# Patient Record
Sex: FEMALE | Race: WHITE | Employment: FULL TIME | URBAN - METROPOLITAN AREA
[De-identification: names, ages, dates, MRNs, and addresses within clinical notes are randomized per-mention and may not be internally consistent; named-entity substitution may affect disease eponyms.]

---

## 2019-06-20 RX ORDER — CEFAZOLIN SODIUM/WATER 2 G/20 ML
2 SYRINGE (ML) INTRAVENOUS ONCE
Status: CANCELLED | OUTPATIENT
Start: 2019-06-20 | End: 2019-06-20

## 2019-07-17 ENCOUNTER — HOSPITAL ENCOUNTER (OUTPATIENT)
Dept: SURGERY | Age: 59
Discharge: HOME | End: 2019-07-17
Payer: OTHER GOVERNMENT

## 2019-07-17 VITALS
TEMPERATURE: 97.9 F | WEIGHT: 151.8 LBS | DIASTOLIC BLOOD PRESSURE: 61 MMHG | RESPIRATION RATE: 16 BRPM | HEIGHT: 65 IN | BODY MASS INDEX: 25.29 KG/M2 | SYSTOLIC BLOOD PRESSURE: 119 MMHG | HEART RATE: 80 BPM | OXYGEN SATURATION: 99 %

## 2019-07-17 LAB
ANION GAP SERPL CALC-SCNC: 7 MMOL/L (ref 7–16)
APPEARANCE UR: CLEAR
BACTERIA SPEC CULT: NORMAL
BASOPHILS # BLD: 0.1 K/UL (ref 0–0.2)
BASOPHILS NFR BLD: 1 % (ref 0–2)
BILIRUB UR QL: NEGATIVE
BUN SERPL-MCNC: 15 MG/DL (ref 6–23)
CALCIUM SERPL-MCNC: 9.4 MG/DL (ref 8.3–10.4)
CHLORIDE SERPL-SCNC: 105 MMOL/L (ref 98–107)
CO2 SERPL-SCNC: 28 MMOL/L (ref 21–32)
COLOR UR: YELLOW
CREAT SERPL-MCNC: 1.3 MG/DL (ref 0.6–1)
DIFFERENTIAL METHOD BLD: ABNORMAL
EOSINOPHIL # BLD: 0.3 K/UL (ref 0–0.8)
EOSINOPHIL NFR BLD: 3 % (ref 0.5–7.8)
ERYTHROCYTE [DISTWIDTH] IN BLOOD BY AUTOMATED COUNT: 13.2 % (ref 11.9–14.6)
GLUCOSE SERPL-MCNC: 93 MG/DL (ref 65–100)
GLUCOSE UR STRIP.AUTO-MCNC: NEGATIVE MG/DL
HCT VFR BLD AUTO: 39.7 % (ref 35.8–46.3)
HGB BLD-MCNC: 13.1 G/DL (ref 11.7–15.4)
HGB UR QL STRIP: NEGATIVE
IMM GRANULOCYTES # BLD AUTO: 0.1 K/UL (ref 0–0.5)
IMM GRANULOCYTES NFR BLD AUTO: 1 % (ref 0–5)
KETONES UR QL STRIP.AUTO: NEGATIVE MG/DL
LEUKOCYTE ESTERASE UR QL STRIP.AUTO: NEGATIVE
LYMPHOCYTES # BLD: 2.8 K/UL (ref 0.5–4.6)
LYMPHOCYTES NFR BLD: 28 % (ref 13–44)
MCH RBC QN AUTO: 31.4 PG (ref 26.1–32.9)
MCHC RBC AUTO-ENTMCNC: 33 G/DL (ref 31.4–35)
MCV RBC AUTO: 95.2 FL (ref 79.6–97.8)
MONOCYTES # BLD: 0.9 K/UL (ref 0.1–1.3)
MONOCYTES NFR BLD: 9 % (ref 4–12)
NEUTS SEG # BLD: 5.9 K/UL (ref 1.7–8.2)
NEUTS SEG NFR BLD: 59 % (ref 43–78)
NITRITE UR QL STRIP.AUTO: NEGATIVE
NRBC # BLD: 0 K/UL (ref 0–0.2)
PH UR STRIP: 6 [PH] (ref 5–9)
PLATELET # BLD AUTO: 278 K/UL (ref 150–450)
PMV BLD AUTO: 12.7 FL (ref 9.4–12.3)
POTASSIUM SERPL-SCNC: 3.1 MMOL/L (ref 3.5–5.1)
PROT UR STRIP-MCNC: NEGATIVE MG/DL
RBC # BLD AUTO: 4.17 M/UL (ref 4.05–5.2)
SERVICE CMNT-IMP: NORMAL
SODIUM SERPL-SCNC: 140 MMOL/L (ref 136–145)
SP GR UR REFRACTOMETRY: 1.01 (ref 1–1.02)
UROBILINOGEN UR QL STRIP.AUTO: 0.2 EU/DL (ref 0.2–1)
WBC # BLD AUTO: 10 K/UL (ref 4.3–11.1)

## 2019-07-17 PROCEDURE — 81003 URINALYSIS AUTO W/O SCOPE: CPT

## 2019-07-17 PROCEDURE — 85025 COMPLETE CBC W/AUTO DIFF WBC: CPT

## 2019-07-17 PROCEDURE — 77030027138 HC INCENT SPIROMETER -A

## 2019-07-17 PROCEDURE — 80048 BASIC METABOLIC PNL TOTAL CA: CPT

## 2019-07-17 PROCEDURE — 87641 MR-STAPH DNA AMP PROBE: CPT

## 2019-07-17 RX ORDER — LANOLIN ALCOHOL/MO/W.PET/CERES
1000 CREAM (GRAM) TOPICAL DAILY
COMMUNITY

## 2019-07-17 RX ORDER — MONTELUKAST SODIUM 10 MG/1
10 TABLET ORAL DAILY
COMMUNITY

## 2019-07-17 RX ORDER — ATORVASTATIN CALCIUM 40 MG/1
40 TABLET, FILM COATED ORAL
COMMUNITY
Start: 2018-03-27

## 2019-07-17 RX ORDER — CETIRIZINE HCL 10 MG
1 TABLET ORAL DAILY
COMMUNITY

## 2019-07-17 RX ORDER — CLONAZEPAM 0.5 MG/1
1 TABLET ORAL
COMMUNITY

## 2019-07-17 RX ORDER — VILAZODONE HYDROCHLORIDE 20 MG/1
20 TABLET ORAL
COMMUNITY

## 2019-07-17 RX ORDER — ALBUTEROL SULFATE 90 UG/1
2 AEROSOL, METERED RESPIRATORY (INHALATION)
COMMUNITY

## 2019-07-17 RX ORDER — LISINOPRIL 20 MG/1
20 TABLET ORAL DAILY
COMMUNITY

## 2019-07-17 RX ORDER — PANTOPRAZOLE SODIUM 40 MG/1
40 TABLET, DELAYED RELEASE ORAL DAILY
COMMUNITY

## 2019-07-17 RX ORDER — DULOXETIN HYDROCHLORIDE 30 MG/1
1 CAPSULE, DELAYED RELEASE ORAL DAILY
COMMUNITY
End: 2021-06-18 | Stop reason: ALTCHOICE

## 2019-07-17 RX ORDER — HYDROCODONE BITARTRATE AND ACETAMINOPHEN 7.5; 325 MG/1; MG/1
1 TABLET ORAL
COMMUNITY
End: 2019-07-24

## 2019-07-17 RX ORDER — OLOPATADINE HYDROCHLORIDE 1 MG/ML
1 SOLUTION/ DROPS OPHTHALMIC
COMMUNITY
Start: 2018-07-06

## 2019-07-17 RX ORDER — ESTRADIOL 0.1 MG/G
CREAM VAGINAL AS NEEDED
COMMUNITY
Start: 2018-04-09 | End: 2021-07-20

## 2019-07-17 RX ORDER — ALBUTEROL SULFATE 0.83 MG/ML
SOLUTION RESPIRATORY (INHALATION)
COMMUNITY

## 2019-07-17 RX ORDER — LOPERAMIDE HYDROCHLORIDE 2 MG/1
1 CAPSULE ORAL AS NEEDED
COMMUNITY

## 2019-07-17 RX ORDER — HYDROCHLOROTHIAZIDE 25 MG/1
25 TABLET ORAL DAILY
COMMUNITY
End: 2021-06-18 | Stop reason: ALTCHOICE

## 2019-07-17 RX ORDER — CYCLOBENZAPRINE HCL 5 MG
5 TABLET ORAL
COMMUNITY
Start: 2019-06-05

## 2019-07-17 NOTE — PERIOP NOTES
Patient verified name & . Order to obtain consent found in EHR and matches case posting. TYPE  CASE:2              Orders:  received  Labs per Spine protocol:  CBC with dif, BMP, UA, MRSA/MSSA nasal swab   Labs per anesthesia protocol: Type and Screen DOS  EKG/cardiac records  :  Not indicated  Glucose: not indicated    Instructed patient to continue previous medications as prescribed prior to surgery and  to [address] according to anesthesia guidelines with a small sip of water : Norco, atorvastatin, Klonopin, Cymbalta, Protonix, singulair, Viibryd, Nebulizer treatment. Pt verbalizes understanding to bring rescue inhaler, spine recovery book and incentive spirometer to the hospital DOS. Continue all previous medications unless otherwise directed. Instructed patient to hold all vitamins and supplements (with exception of renal vitamins) and the following medications prior to surgery: none    Pt viewed Spine Pre-hab Video. All further questions were addressed. Pt was provided with antibacterial or non-moisturizing soap, Hibiclens, long-handled sponge, Spine Recovery booklet and incentive spirometer. Pt correctly demonstrated use of incentive spirometer and instruction to bring it to the hospital on day of surgery. Handouts and all Surgery instructions provided to pt and pt verbalizes understanding. Patient Guide to Surgery Packet provided to patient. Packet includes Patient Guide to surgery handout, Facts about Pain Management handout, Facts about Urinary Catherization handout, Hand Hygiene handout, Patient Education and Teaching Sheet -Transfusion of Blood and Blood Products handout, and  [facility] frequent question and answer sheet. Guide reviewed with the patient and all questions answered to the satisfaction of the patient. Pt advised to visit [email or web address]-SENS for more educational information regarding anesthesia and to record any additional questions that arise so that it can be addressed by the anesthesiologist on the morning of surgery. Patient instructed on the following and verbalized understanding:  Arrive at MAIN entrance, time of arrival to be called the day before by 1700. Responsible adult must drive patient to and from hospital, stay during surgery and 24 hours postoperatively. Npo after midnight including gum, mints and ice chips. Shower using hibiclens the night before and the morning of surgery. Hibiclens provided to the patient with handout and verbal instructions for use. Leave all valuables at home. Instructed on no jewelry or body piercings on the dos. Bring insurance card and ID. No perfumes, oil, powder, colognes, makeup or  lotions on the skin. Patient verbalized understanding of all instructions and provided all medical/health information to the best of their ability.

## 2019-07-17 NOTE — PERIOP NOTES
Recent Results (from the past 12 hour(s))   MSSA/MRSA SC BY PCR, NASAL SWAB    Collection Time: 07/17/19  8:21 AM   Result Value Ref Range    Special Requests: NO SPECIAL REQUESTS      Culture result:        SA target not detected. A MRSA NEGATIVE, SA NEGATIVE test result does not preclude MRSA or SA nasal colonization. CBC WITH AUTOMATED DIFF    Collection Time: 07/17/19  8:21 AM   Result Value Ref Range    WBC 10.0 4.3 - 11.1 K/uL    RBC 4.17 4.05 - 5.2 M/uL    HGB 13.1 11.7 - 15.4 g/dL    HCT 39.7 35.8 - 46.3 %    MCV 95.2 79.6 - 97.8 FL    MCH 31.4 26.1 - 32.9 PG    MCHC 33.0 31.4 - 35.0 g/dL    RDW 13.2 11.9 - 14.6 %    PLATELET 110 512 - 474 K/uL    MPV 12.7 (H) 9.4 - 12.3 FL    ABSOLUTE NRBC 0.00 0.0 - 0.2 K/uL    DF AUTOMATED      NEUTROPHILS 59 43 - 78 %    LYMPHOCYTES 28 13 - 44 %    MONOCYTES 9 4.0 - 12.0 %    EOSINOPHILS 3 0.5 - 7.8 %    BASOPHILS 1 0.0 - 2.0 %    IMMATURE GRANULOCYTES 1 0.0 - 5.0 %    ABS. NEUTROPHILS 5.9 1.7 - 8.2 K/UL    ABS. LYMPHOCYTES 2.8 0.5 - 4.6 K/UL    ABS. MONOCYTES 0.9 0.1 - 1.3 K/UL    ABS. EOSINOPHILS 0.3 0.0 - 0.8 K/UL    ABS. BASOPHILS 0.1 0.0 - 0.2 K/UL    ABS. IMM.  GRANS. 0.1 0.0 - 0.5 K/UL   METABOLIC PANEL, BASIC    Collection Time: 07/17/19  8:21 AM   Result Value Ref Range    Sodium 140 136 - 145 mmol/L    Potassium 3.1 (L) 3.5 - 5.1 mmol/L    Chloride 105 98 - 107 mmol/L    CO2 28 21 - 32 mmol/L    Anion gap 7 7 - 16 mmol/L    Glucose 93 65 - 100 mg/dL    BUN 15 6 - 23 MG/DL    Creatinine 1.30 (H) 0.6 - 1.0 MG/DL    GFR est AA 54 (L) >60 ml/min/1.73m2    GFR est non-AA 45 (L) >60 ml/min/1.73m2    Calcium 9.4 8.3 - 10.4 MG/DL   URINALYSIS W/ RFLX MICROSCOPIC    Collection Time: 07/17/19  8:21 AM   Result Value Ref Range    Color YELLOW      Appearance CLEAR      Specific gravity 1.010 1.001 - 1.023      pH (UA) 6.0 5.0 - 9.0      Protein NEGATIVE  NEG mg/dL    Glucose NEGATIVE  NEG mg/dL    Ketone NEGATIVE  NEG mg/dL    Bilirubin NEGATIVE  NEG      Blood NEGATIVE  NEG      Urobilinogen 0.2 0.2 - 1.0 EU/dL    Nitrites NEGATIVE  NEG      Leukocyte Esterase NEGATIVE  NEG      Lab results reviewed. No further action indicated.

## 2019-07-22 NOTE — H&P
Chief Compliant: Neck pain, headaches, tremor, gait imbalance    History of present illness: This is a very pleasant 61year old female who presents with a one year history of neck pain and radiation primarily to the right greater than left shoulder and upper extremity. She has not also noticed headaches, gait imbalance, hand writing change. The onset of the symptoms was rather insidious. She describes the quality of the pain as a deep ache with intermittent sharp and shooting sensations. A tingling sensation is in the right posterior arm and ulnar forearm. There is also some associated pain in the periscapular area. She has noticed changes in fine motor skills such as handwriting and buttoning buttons. She has also noticed change in gait since the onset of the symptoms. The symptoms seem to be aggravated by overhead activities, and somewhat alleviated by rest. Pain is rated 10/10 on the Visual Analog Scale. Thus far, efforts to address the pain have included NSAIDs, pain medication, muscle relaxants, physical therapy. PMHx/PSHx/Medications/Allergies/ROS are listed and have been reviewed. Review of systems was noted. Pertinent positives and negatives were discussed with the patient particularly those that related to musculoskeletal complaints. Nonorthopedic complaints were directed to the primary care physician. Medications: Atorvastatin Calcium; Cetirizine HCl;Cyclobenzaprine HCl;DULoxetine HCl;hydroCHLOROthiazide; Lisinopril;Norco;Olopatadine HCl;Pantoprazole Sodium;Singulair;Viibryd;Vitamin B12;Vitamin D3  ????? Allergies: NKDA  ?????    Physical Exam:     This is a well developed well nourished adult female in no acute distress. She is oriented to person, place and time. Mood and affect are appropriate. Respirations are unlabored and there is no evidence of cyanosis. Chest is clear to auscultation bilaterally. Heart is regular rate and rhythm.     Inspection of the neck reveals no evidence of rash or skin lesion. Examination of the cervical spine reveals no evidence of sagittal or coronal plane deformity. She can flex normally but extension is limited by exacerbation of the symptoms. Spurlings sign is positive for reproduction of radicular symptoms. Lhermitte sign is positive. There is not significant tenderness to palpation along the spinous processes or paraspinal musculature. Sensory testing reveals intact sensation to light touch in the distribution of the C5-T1 dermatomes bilaterally, except for decreased sensation over the bilateral ulnar forearms. Gait is unsteady. Reflexes    Right Left   Biceps (C5) 3 3   Brachio radialis (C6) 3 3    Triceps (C7) 3 3               Hiltons is positive. Ankle jerk is positive. Rhomberg testing is positive. Finger escape test is positive. Inverted radial reflex is positive. Tinels and Jose testing over the cubital and carpal tunnels do not reproduce the symptoms. Shoulder examination is not consistent with adhesive capsulitis or acute rotator cuff tendinitis. The patient does have difficulty with rapid alternating hand movements. Strength testing in the upper extremity reveals the following based on the 5 point grading scale:     Delt(C5) Bicep(C6) WE(C6) Tricep (C7) WF(C7) (C8) Int (T1)   Right NT NT 5 5 5 4 4   Left 5 5 5 3 4 4 4       Pulses are palpable over bilateral radial arteries. Radiographic Studies:          MRI Cervical spine, report and images independently reviewed and reveals degenerative disc and spondylotic changes at C5-C6 and C6-C7 resulting in moderate to significant stenosis. There is subtle myelomalacia. 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    I discussed the natural history of this condition with her in that this condition is typically slowly progressive and may begin to affect gait and coordination to a greater extent. Since myelopathy is typically progressive and can lead to irreversible neurologic deficits, we also discussed potential surgical options. ????? We discussed the details of the surgery including an incision over the left side of the front of the neck. The windpipe and foodpipe would be retracted to the side to expose the underlying spine. The appropriate disc would be identified with an X-ray and the disc would be removed. The nerves would be freed by trimming any impinging structures such as bone spurs and disc material.   The disc space would then be filled with a spacer and bone graft from a cadaver. A drain may be placed, and then the wound would be closed with suture and covered with sterile dressings. She would expect to either be discharged home postoperatively or stay in the hospital overnight depending on how quickly she recovers. Follow-up would be scheduled for 2-3 weeks and she would have restrictions including no driving for 2 weeks, no lifting greater than 15 lbs. We also discussed the potential risks of the surgery including, but not limited to infection, spinal fluid leak, compressive hematoma; injury to spinal cord or peripheral nerve root resulting in paralysis, or loss of use of an extremity; persistent neck or arm symptoms or pain at the bone graft site; failure of the bone graft to heal or failure of the hardware resulting in the possibility of needing additional surgery; postoperative hoarseness or dysphagia; injury to an artery or vein resulting in significant blood loss; and the risks of anesthesia including, but not limited heart attack, stroke, blood clot or death. The patient was also given a brochure on anterior cervical discectomy and fusion. The patient voiced an understanding of these issues outlined.   The procedure that I think may be beneficial here is an anterior cervical discectomy and fusion with allograft, interbody spacer, and instrumentation from -7.             Electronically Signed By Bonny West MD

## 2019-07-23 ENCOUNTER — ANESTHESIA EVENT (OUTPATIENT)
Dept: SURGERY | Age: 59
End: 2019-07-23
Payer: OTHER GOVERNMENT

## 2019-07-24 ENCOUNTER — APPOINTMENT (OUTPATIENT)
Dept: GENERAL RADIOLOGY | Age: 59
End: 2019-07-24
Attending: ORTHOPAEDIC SURGERY
Payer: OTHER GOVERNMENT

## 2019-07-24 ENCOUNTER — ANESTHESIA (OUTPATIENT)
Dept: SURGERY | Age: 59
End: 2019-07-24
Payer: OTHER GOVERNMENT

## 2019-07-24 ENCOUNTER — HOSPITAL ENCOUNTER (OUTPATIENT)
Age: 59
Setting detail: OUTPATIENT SURGERY
Discharge: HOME | End: 2019-07-24
Attending: ORTHOPAEDIC SURGERY | Admitting: ORTHOPAEDIC SURGERY
Payer: OTHER GOVERNMENT

## 2019-07-24 VITALS
HEART RATE: 82 BPM | HEIGHT: 64 IN | RESPIRATION RATE: 14 BRPM | OXYGEN SATURATION: 94 % | WEIGHT: 153.13 LBS | TEMPERATURE: 97.9 F | SYSTOLIC BLOOD PRESSURE: 110 MMHG | BODY MASS INDEX: 26.14 KG/M2 | DIASTOLIC BLOOD PRESSURE: 58 MMHG

## 2019-07-24 DIAGNOSIS — M48.02 CERVICAL SPINAL STENOSIS: Primary | ICD-10-CM

## 2019-07-24 PROBLEM — G95.9 MYELOPATHY (HCC): Status: ACTIVE | Noted: 2019-07-24

## 2019-07-24 LAB
ABO + RH BLD: NORMAL
BLOOD GROUP ANTIBODIES SERPL: NORMAL
SPECIMEN EXP DATE BLD: NORMAL

## 2019-07-24 PROCEDURE — 77030003666 HC NDL SPINAL BD -A: Performed by: ORTHOPAEDIC SURGERY

## 2019-07-24 PROCEDURE — 74011250636 HC RX REV CODE- 250/636: Performed by: ORTHOPAEDIC SURGERY

## 2019-07-24 PROCEDURE — 72020 X-RAY EXAM OF SPINE 1 VIEW: CPT

## 2019-07-24 PROCEDURE — 77030025623 HC BUR RND PRECIS STRY -D: Performed by: ORTHOPAEDIC SURGERY

## 2019-07-24 PROCEDURE — 77030030163 HC BN WAX J&J -A: Performed by: ORTHOPAEDIC SURGERY

## 2019-07-24 PROCEDURE — 76010000162 HC OR TIME 1.5 TO 2 HR INTENSV-TIER 1: Performed by: ORTHOPAEDIC SURGERY

## 2019-07-24 PROCEDURE — 77030016570 HC BLNKT BAIR HGGR 3M -B: Performed by: ANESTHESIOLOGY

## 2019-07-24 PROCEDURE — 77030031139 HC SUT VCRL2 J&J -A: Performed by: ORTHOPAEDIC SURGERY

## 2019-07-24 PROCEDURE — 74011250637 HC RX REV CODE- 250/637: Performed by: ORTHOPAEDIC SURGERY

## 2019-07-24 PROCEDURE — C1713 ANCHOR/SCREW BN/BN,TIS/BN: HCPCS | Performed by: ORTHOPAEDIC SURGERY

## 2019-07-24 PROCEDURE — 86900 BLOOD TYPING SEROLOGIC ABO: CPT

## 2019-07-24 PROCEDURE — 77030018836 HC SOL IRR NACL ICUM -A: Performed by: ORTHOPAEDIC SURGERY

## 2019-07-24 PROCEDURE — 77030021678 HC GLIDESCP STAT DISP VERT -B: Performed by: ANESTHESIOLOGY

## 2019-07-24 PROCEDURE — 74011000250 HC RX REV CODE- 250

## 2019-07-24 PROCEDURE — 77030037088 HC TUBE ENDOTRACH ORAL NSL COVD-A: Performed by: ANESTHESIOLOGY

## 2019-07-24 PROCEDURE — 74011250636 HC RX REV CODE- 250/636: Performed by: ANESTHESIOLOGY

## 2019-07-24 PROCEDURE — 76210000006 HC OR PH I REC 0.5 TO 1 HR: Performed by: ORTHOPAEDIC SURGERY

## 2019-07-24 PROCEDURE — 76060000034 HC ANESTHESIA 1.5 TO 2 HR: Performed by: ORTHOPAEDIC SURGERY

## 2019-07-24 PROCEDURE — 77030019908 HC STETH ESOPH SIMS -A: Performed by: ANESTHESIOLOGY

## 2019-07-24 PROCEDURE — 77030002986 HC SUT PROL J&J -A: Performed by: ORTHOPAEDIC SURGERY

## 2019-07-24 PROCEDURE — 77030011267 HC ELECTRD BLD COVD -A: Performed by: ORTHOPAEDIC SURGERY

## 2019-07-24 PROCEDURE — 74011250636 HC RX REV CODE- 250/636

## 2019-07-24 PROCEDURE — 74011250637 HC RX REV CODE- 250/637: Performed by: ANESTHESIOLOGY

## 2019-07-24 PROCEDURE — 74011000250 HC RX REV CODE- 250: Performed by: ORTHOPAEDIC SURGERY

## 2019-07-24 PROCEDURE — 77030014650 HC SEAL MTRX FLOSEL BAXT -C: Performed by: ORTHOPAEDIC SURGERY

## 2019-07-24 PROCEDURE — 76210000020 HC REC RM PH II FIRST 0.5 HR: Performed by: ORTHOPAEDIC SURGERY

## 2019-07-24 PROCEDURE — 77030010514 HC APPL CLP LIG COVD -B: Performed by: ORTHOPAEDIC SURGERY

## 2019-07-24 PROCEDURE — 77030039155 HC CGE SPN ANT CERV TRITANIUM STRY -G: Performed by: ORTHOPAEDIC SURGERY

## 2019-07-24 PROCEDURE — 77030009868 HC PIN DISTR CASPR AESC -B: Performed by: ORTHOPAEDIC SURGERY

## 2019-07-24 PROCEDURE — 77030032490 HC SLV COMPR SCD KNE COVD -B: Performed by: ORTHOPAEDIC SURGERY

## 2019-07-24 DEVICE — GRAFT BNE SUB 1CC 2MM GRAN ALLOGENIC MORPHOGENETIC PROT W: Type: IMPLANTABLE DEVICE | Site: SPINE CERVICAL | Status: FUNCTIONAL

## 2019-07-24 DEVICE — ANTERIOR CERVICAL CAGE
Type: IMPLANTABLE DEVICE | Site: SPINE CERVICAL | Status: FUNCTIONAL
Brand: TRITANIUM C

## 2019-07-24 DEVICE — VARIABLE, SELF-DRILLING SCREW
Type: IMPLANTABLE DEVICE | Site: SPINE CERVICAL | Status: FUNCTIONAL
Brand: AVIATOR

## 2019-07-24 DEVICE — BIO DBM PUTTY
Type: IMPLANTABLE DEVICE | Site: SPINE CERVICAL | Status: FUNCTIONAL
Brand: BIO DBM

## 2019-07-24 DEVICE — TWO-LEVEL ANTERIOR PLATE
Type: IMPLANTABLE DEVICE | Site: SPINE CERVICAL | Status: FUNCTIONAL
Brand: AVIATOR

## 2019-07-24 RX ORDER — FENTANYL CITRATE 50 UG/ML
100 INJECTION, SOLUTION INTRAMUSCULAR; INTRAVENOUS ONCE
Status: DISCONTINUED | OUTPATIENT
Start: 2019-07-24 | End: 2019-07-24 | Stop reason: HOSPADM

## 2019-07-24 RX ORDER — OXYCODONE HYDROCHLORIDE 5 MG/1
5 TABLET ORAL
Status: COMPLETED | OUTPATIENT
Start: 2019-07-24 | End: 2019-07-24

## 2019-07-24 RX ORDER — PROMETHAZINE HYDROCHLORIDE 12.5 MG/1
12.5 TABLET ORAL
Qty: 30 TAB | Refills: 0 | Status: SHIPPED | OUTPATIENT
Start: 2019-07-24 | End: 2021-07-20

## 2019-07-24 RX ORDER — LIDOCAINE HYDROCHLORIDE 10 MG/ML
0.1 INJECTION INFILTRATION; PERINEURAL AS NEEDED
Status: DISCONTINUED | OUTPATIENT
Start: 2019-07-24 | End: 2019-07-24 | Stop reason: HOSPADM

## 2019-07-24 RX ORDER — GLYCOPYRROLATE 0.2 MG/ML
INJECTION INTRAMUSCULAR; INTRAVENOUS AS NEEDED
Status: DISCONTINUED | OUTPATIENT
Start: 2019-07-24 | End: 2019-07-24 | Stop reason: HOSPADM

## 2019-07-24 RX ORDER — LIDOCAINE HYDROCHLORIDE 20 MG/ML
INJECTION, SOLUTION EPIDURAL; INFILTRATION; INTRACAUDAL; PERINEURAL AS NEEDED
Status: DISCONTINUED | OUTPATIENT
Start: 2019-07-24 | End: 2019-07-24

## 2019-07-24 RX ORDER — EPHEDRINE SULFATE 50 MG/ML
INJECTION, SOLUTION INTRAVENOUS AS NEEDED
Status: DISCONTINUED | OUTPATIENT
Start: 2019-07-24 | End: 2019-07-24 | Stop reason: HOSPADM

## 2019-07-24 RX ORDER — SODIUM CHLORIDE, SODIUM LACTATE, POTASSIUM CHLORIDE, CALCIUM CHLORIDE 600; 310; 30; 20 MG/100ML; MG/100ML; MG/100ML; MG/100ML
75 INJECTION, SOLUTION INTRAVENOUS CONTINUOUS
Status: DISCONTINUED | OUTPATIENT
Start: 2019-07-24 | End: 2019-07-24 | Stop reason: HOSPADM

## 2019-07-24 RX ORDER — HYDROMORPHONE HYDROCHLORIDE 2 MG/ML
0.5 INJECTION, SOLUTION INTRAMUSCULAR; INTRAVENOUS; SUBCUTANEOUS
Status: DISCONTINUED | OUTPATIENT
Start: 2019-07-24 | End: 2019-07-24 | Stop reason: HOSPADM

## 2019-07-24 RX ORDER — CLINDAMYCIN PHOSPHATE 900 MG/50ML
900 INJECTION INTRAVENOUS
Status: COMPLETED | OUTPATIENT
Start: 2019-07-24 | End: 2019-07-24

## 2019-07-24 RX ORDER — ONDANSETRON 2 MG/ML
INJECTION INTRAMUSCULAR; INTRAVENOUS AS NEEDED
Status: DISCONTINUED | OUTPATIENT
Start: 2019-07-24 | End: 2019-07-24 | Stop reason: HOSPADM

## 2019-07-24 RX ORDER — ROCURONIUM BROMIDE 10 MG/ML
INJECTION, SOLUTION INTRAVENOUS AS NEEDED
Status: DISCONTINUED | OUTPATIENT
Start: 2019-07-24 | End: 2019-07-24 | Stop reason: HOSPADM

## 2019-07-24 RX ORDER — HYDROCODONE BITARTRATE AND ACETAMINOPHEN 5; 325 MG/1; MG/1
2 TABLET ORAL AS NEEDED
Status: DISCONTINUED | OUTPATIENT
Start: 2019-07-24 | End: 2019-07-24 | Stop reason: HOSPADM

## 2019-07-24 RX ORDER — LIDOCAINE HYDROCHLORIDE 20 MG/ML
INJECTION, SOLUTION EPIDURAL; INFILTRATION; INTRACAUDAL; PERINEURAL AS NEEDED
Status: DISCONTINUED | OUTPATIENT
Start: 2019-07-24 | End: 2019-07-24 | Stop reason: HOSPADM

## 2019-07-24 RX ORDER — OXYCODONE HYDROCHLORIDE 5 MG/1
5 TABLET ORAL
Qty: 40 TAB | Refills: 0 | Status: SHIPPED | OUTPATIENT
Start: 2019-07-24 | End: 2019-07-31

## 2019-07-24 RX ORDER — FENTANYL CITRATE 50 UG/ML
INJECTION, SOLUTION INTRAMUSCULAR; INTRAVENOUS AS NEEDED
Status: DISCONTINUED | OUTPATIENT
Start: 2019-07-24 | End: 2019-07-24 | Stop reason: HOSPADM

## 2019-07-24 RX ORDER — MIDAZOLAM HYDROCHLORIDE 1 MG/ML
2 INJECTION, SOLUTION INTRAMUSCULAR; INTRAVENOUS
Status: DISCONTINUED | OUTPATIENT
Start: 2019-07-24 | End: 2019-07-24 | Stop reason: HOSPADM

## 2019-07-24 RX ORDER — NEOSTIGMINE METHYLSULFATE 1 MG/ML
INJECTION INTRAVENOUS AS NEEDED
Status: DISCONTINUED | OUTPATIENT
Start: 2019-07-24 | End: 2019-07-24 | Stop reason: HOSPADM

## 2019-07-24 RX ORDER — ACETAMINOPHEN 10 MG/ML
INJECTION, SOLUTION INTRAVENOUS AS NEEDED
Status: DISCONTINUED | OUTPATIENT
Start: 2019-07-24 | End: 2019-07-24 | Stop reason: HOSPADM

## 2019-07-24 RX ORDER — PROPOFOL 10 MG/ML
INJECTION, EMULSION INTRAVENOUS AS NEEDED
Status: DISCONTINUED | OUTPATIENT
Start: 2019-07-24 | End: 2019-07-24 | Stop reason: HOSPADM

## 2019-07-24 RX ADMIN — FENTANYL CITRATE 50 MCG: 50 INJECTION, SOLUTION INTRAMUSCULAR; INTRAVENOUS at 07:22

## 2019-07-24 RX ADMIN — GLYCOPYRROLATE 0.4 MG: 0.2 INJECTION INTRAMUSCULAR; INTRAVENOUS at 08:37

## 2019-07-24 RX ADMIN — SODIUM CHLORIDE, SODIUM LACTATE, POTASSIUM CHLORIDE, AND CALCIUM CHLORIDE: 600; 310; 30; 20 INJECTION, SOLUTION INTRAVENOUS at 08:37

## 2019-07-24 RX ADMIN — HYDROMORPHONE HYDROCHLORIDE 0.5 MG: 2 INJECTION INTRAMUSCULAR; INTRAVENOUS; SUBCUTANEOUS at 08:57

## 2019-07-24 RX ADMIN — SODIUM CHLORIDE, SODIUM LACTATE, POTASSIUM CHLORIDE, AND CALCIUM CHLORIDE: 600; 310; 30; 20 INJECTION, SOLUTION INTRAVENOUS at 07:13

## 2019-07-24 RX ADMIN — Medication 3 AMPULE: at 06:04

## 2019-07-24 RX ADMIN — LIDOCAINE HYDROCHLORIDE 100 MG: 20 INJECTION, SOLUTION EPIDURAL; INFILTRATION; INTRACAUDAL; PERINEURAL at 07:22

## 2019-07-24 RX ADMIN — ROCURONIUM BROMIDE 10 MG: 10 INJECTION, SOLUTION INTRAVENOUS at 08:19

## 2019-07-24 RX ADMIN — NEOSTIGMINE METHYLSULFATE 3 MG: 1 INJECTION INTRAVENOUS at 08:37

## 2019-07-24 RX ADMIN — PROPOFOL 200 MG: 10 INJECTION, EMULSION INTRAVENOUS at 07:22

## 2019-07-24 RX ADMIN — SODIUM CHLORIDE, SODIUM LACTATE, POTASSIUM CHLORIDE, AND CALCIUM CHLORIDE 75 ML/HR: 600; 310; 30; 20 INJECTION, SOLUTION INTRAVENOUS at 06:04

## 2019-07-24 RX ADMIN — ONDANSETRON 4 MG: 2 INJECTION INTRAMUSCULAR; INTRAVENOUS at 08:37

## 2019-07-24 RX ADMIN — ROCURONIUM BROMIDE 40 MG: 10 INJECTION, SOLUTION INTRAVENOUS at 07:22

## 2019-07-24 RX ADMIN — ACETAMINOPHEN 1000 MG: 10 INJECTION, SOLUTION INTRAVENOUS at 08:12

## 2019-07-24 RX ADMIN — OXYCODONE HYDROCHLORIDE 5 MG: 5 TABLET ORAL at 09:10

## 2019-07-24 RX ADMIN — FENTANYL CITRATE 50 MCG: 50 INJECTION, SOLUTION INTRAMUSCULAR; INTRAVENOUS at 07:41

## 2019-07-24 RX ADMIN — HYDROMORPHONE HYDROCHLORIDE 0.5 MG: 2 INJECTION INTRAMUSCULAR; INTRAVENOUS; SUBCUTANEOUS at 09:08

## 2019-07-24 RX ADMIN — EPHEDRINE SULFATE 10 MG: 50 INJECTION, SOLUTION INTRAVENOUS at 07:52

## 2019-07-24 RX ADMIN — CLINDAMYCIN PHOSPHATE 900 MG: 900 INJECTION, SOLUTION INTRAVENOUS at 07:15

## 2019-07-24 NOTE — DISCHARGE INSTRUCTIONS
See attached discharge sheet. After general anesthesia or intravenous sedation, for 24 hours or while taking prescription Narcotics:  · Limit your activities  · A responsible adult needs to be with you for the next 24 hours  · Do not drive and operate hazardous machinery  · Do not make important personal or business decisions  · Do  not drink alcoholic beverages  · If you have not urinated within 8 hours after discharge, please contact your surgeon on call. *  Please give a list of your current medications to your Primary Care Provider. *  Please update this list whenever your medications are discontinued, doses are      changed, or new medications (including over-the-counter products) are added. *  Please carry medication information at all times in case of emergency situations. These are general instructions for a healthy lifestyle:  No smoking/ No tobacco products/ Avoid exposure to second hand smoke  Surgeon General's Warning:  Quitting smoking now greatly reduces serious risk to your health. Obesity, smoking, and sedentary lifestyle greatly increases your risk for illness  A healthy diet, regular physical exercise & weight monitoring are important for maintaining a healthy lifestyle    You may be retaining fluid if you have a history of heart failure or if you experience any of the following symptoms:  Weight gain of 3 pounds or more overnight or 5 pounds in a week, increased swelling in our hands or feet or shortness of breath while lying flat in bed. Please call your doctor as soon as you notice any of these symptoms; do not wait until your next office visit.

## 2019-07-24 NOTE — ANESTHESIA POSTPROCEDURE EVALUATION
Procedure(s):  C5 C7 ACDF WITH INTERBODY SPACERS AND INSTRUMENTATION. general    Anesthesia Post Evaluation      Multimodal analgesia: multimodal analgesia used between 6 hours prior to anesthesia start to PACU discharge  Patient location during evaluation: bedside  Patient participation: complete - patient participated  Level of consciousness: awake and alert  Pain score: 3  Pain management: adequate  Airway patency: patent  Anesthetic complications: no  Cardiovascular status: acceptable and hemodynamically stable  Respiratory status: acceptable  Hydration status: acceptable  Post anesthesia nausea and vomiting:  none      Vitals Value Taken Time   /55 7/24/2019  9:21 AM   Temp 36.6 °C (97.9 °F) 7/24/2019  9:27 AM   Pulse 84 7/24/2019  9:32 AM   Resp 15 7/24/2019  9:21 AM   SpO2 96 % 7/24/2019  9:32 AM   Vitals shown include unvalidated device data.

## 2019-07-24 NOTE — ANESTHESIA PREPROCEDURE EVALUATION
Relevant Problems   No relevant active problems       Anesthetic History     PONV          Review of Systems / Medical History  Patient summary reviewed and pertinent labs reviewed    Pulmonary    COPD: mild      Smoker  Asthma : well controlled       Neuro/Psych   Within defined limits           Cardiovascular    Hypertension              Exercise tolerance: >4 METS     GI/Hepatic/Renal     GERD: well controlled    Renal disease: CRI       Endo/Other        Arthritis     Other Findings              Physical Exam    Airway  Mallampati: II  TM Distance: 4 - 6 cm  Neck ROM: normal range of motion   Mouth opening: Normal     Cardiovascular  Regular rate and rhythm,  S1 and S2 normal,  no murmur, click, rub, or gallop             Dental    Dentition: Full upper dentures and Full lower dentures     Pulmonary  Breath sounds clear to auscultation               Abdominal         Other Findings            Anesthetic Plan    ASA: 3  Anesthesia type: general          Induction: Intravenous  Anesthetic plan and risks discussed with: Patient and Spouse

## 2019-07-24 NOTE — OP NOTES
[address] [address]   [phone]    OPERATIVE REPORT  Patient ID:Marci Carpenter  [id number]  1960  61 y.o. DATE OF SURGERY: 7/24/2019    SURGEON: Jose Martin Austin M.D. PREOPERATIVE DIAGNOSIS:  C5 - C7 stenosis. POSTOPERATIVE DIAGNOSIS:  C5 - C7 stenosis. PROCEDURE:     1. Anterior cervical diskectomy and fusion C5 - C7.  (CPT 19972, 22552 X 1)     2. Anterior cervical instrumentation  C5 - C7.  (CPT 69139)     3. Insertion biomechanical device  C5 - C6 and C6-C7 (CPT 22853 X 2)    ANESTHESIA:  General.    ESTIMATED BLOOD LOSS:  50 cc    INTRAOPERATIVE COMPLICATIONS:  None. POSTOPERATIVE CONDITION:  Stable. IMPLANTS:   Implant Name Type Inv. Item Serial No.  Lot No. LRB No. Used Action   CAGE ANTR CERV [id number] STRL -- TRITANIUM-C - [id number]  CAGE ANTR CERV [id number] STRL -- TRITANIUM-C  HERNAN SPINE HOWM [id number] N/A 1 Implanted   GRAFT BNE DBM PTTY W/CHIPS 1ML -- BIO DBM - [id number]  GRAFT BNE DBM PTTY W/CHIPS 1ML -- BIO DBM  HERNAN SPINE HOWM [id number] N/A 1 Implanted   GRAFT BNE GRAN 1ML -- OSTEOAMP - [id number]  GRAFT BNE GRAN 1ML -- OSTEOAMP [id number] [id number]  N/A 1 Implanted   CAGE ANTR CERV [id number] STRL -- TRITANIUM-C - [id number]  CAGE ANTR CERV [id number] STRL -- TRITANIUM-C  HERNAN SPINE HOWM  N/A 1 Implanted   PLATE ANTR CERV 2 LVL SZ 28MM -- AVIATOR - [id number]  PLATE ANTR CERV 2 LVL SZ 28MM -- AVIATOR  HERNAN SPINE HOWM [id number] N/A 1 Implanted   SCR BNE VA SD 4X14MM -- AVIATOR - [id number]  SCR BNE VA SD 4X14MM -- AVIATOR  HERNAN SPINE HOWM [id number] N/A 6 Implanted       INDICATIONS FOR PROCEDURE:  The patient has had persistent symptoms of cervical radiculopathy despite conservative treatment. The preoperative studies confirmed a concordant stenotic lesion resulting in neural inpingement.    The risks, benefits and potential complications of the above listed procedures were discussed with the patient in detail and an informed consent was obtained. DESCRIPTION OF PROCEDURE:  After adequate induction of general anesthesia the patient was positioned supine on the operating table. A shoulder roll was placed and the shoulders were taped caudally to facilitate intraoperative radiographic imaging. The neck was kept in a neutral position. Care was taken to pad all bony prominences. Preoperative antibiotics were given. The neck was prepped and draped in the usual sterile fashion. A time-out was called to confirm appropriate patient, proposed procedure and proposed incision site. With this confirmation an incision was created over the left anterior lateral aspect of the neck centered near the cricoid cartilage. Dissection was carried down through the platysma using electrocautery. A Betancourt-Soliz approach was then performed down to the anterior cervical spine. A spinal needle was inserted in an interspace and a cross-table lateral fluoroscopic image was obtained. The appropriate level was marked with electrocautery and the spinal needle was removed. At this point the longus colli was elevated around the periphery of the appropriate disk space and [place] retractors were  inserted beneath the longus colli. [place] pins were then inserted in the C5 and C6 vertebral bodies and distraction applied across the annulus fibrosus. The operating microscope was draped and brought to the sterile field. An annulotomy was performed with a 15 blade and a complete diskectomy was performed using pituitary and 3 mm Kerrison. The diskectomy was carried out to the lateral border of the uncovertebral joints bilaterally. A 4 mm bur was then used to trim the anterior osteophytes as well as flatten the vertebral endplates in preparation for arthrodesis. The anterior aspect of the uncovertebral joints were also resected using the bur.   The posterior portions of the uncovertebral joints were taken down with a 2 mm Kerrison. An interval was developed in the posterior longitudinal ligament and annulus fibrosus with a micro nerve hook. A 2 mm Kerrison was then used to resect these structures out to the lateral border of the uncal vertebral joints bilaterally. A ball tipped nerve hook was used to palpate laterally and confirm no residual nerve root or spinal cord impingement. This was felt to be satisfactory bilaterally. The interbody sizing system was brought to the field and a size 5  lordotic spacer fit very nicely. The appropriate size spacer was selected and filled with allograft and impacted with a tamp and mallet after the wound was liberally irrigated. [place] pins were then inserted in the C6 and C7 vertebral bodies and distraction applied across the annulus fibrosus. The operating microscope was draped and brought to the sterile field. An annulotomy was performed with a 15 blade and a complete diskectomy was performed using pituitary and 3 mm Kerrison. The diskectomy was carried out to the lateral border of the uncovertebral joints bilaterally. A 4 mm bur was then used to trim the anterior osteophytes as well as flatten the vertebral endplates in preparation for arthrodesis. The anterior aspect of the uncovertebral joints were also resected using the bur. The posterior portions of the uncovertebral joints were taken down with a 2 mm Kerrison. An interval was developed in the posterior longitudinal ligament and annulus fibrosus with a micro nerve hook. A 2 mm Kerrison was then used to resect these structures out to the lateral border of the uncal vertebral joints bilaterally. A ball tipped nerve hook was used to palpate laterally and confirm no residual nerve root or spinal cord impingement. This was felt to be satisfactory bilaterally. The interbody sizing system was brought to the field and a size 6  lordotic spacer fit very nicely.  The appropriate size spacer selected filled with allograft and impacted with a bone tamp and mallet after the wound was liberally irrigated. The anterior cervical plating system was brought to the field and an appropriate size plate was selected and applied across  C5 - C7. The peripheral screw holes were drilled and filled appropriate length screws. The locking mechanism was tightened. C-arm fluoroscopy was brought in and used to obtain AP and lateral images, both of which were felt to be satisfactory for appropriate spinal level, graft and hardware placement. The wound was liberally irrigated. The incision and the incision was closed in a layered fashion. Benzoin and Steri-Strips were applied. Sterile dressings were applied. The patient tolerated the procedure well and was returned to the post anesthesia care unit in stable condition.      Ramin Linn MD

## 2021-07-20 ENCOUNTER — HOSPITAL ENCOUNTER (OUTPATIENT)
Dept: SURGERY | Age: 61
Discharge: HOME | End: 2021-07-20
Payer: OTHER GOVERNMENT

## 2021-07-20 VITALS
SYSTOLIC BLOOD PRESSURE: 174 MMHG | WEIGHT: 142.7 LBS | BODY MASS INDEX: 24.36 KG/M2 | DIASTOLIC BLOOD PRESSURE: 71 MMHG | TEMPERATURE: 97.7 F | OXYGEN SATURATION: 100 % | HEIGHT: 64 IN | RESPIRATION RATE: 18 BRPM | HEART RATE: 68 BPM

## 2021-07-20 LAB — HGB BLD-MCNC: 13.8 G/DL (ref 11.7–15.4)

## 2021-07-20 PROCEDURE — 85018 HEMOGLOBIN: CPT

## 2021-07-20 RX ORDER — ISOPROPYL ALCOHOL IN GLYCERIN 95 %-5 %
DROPS OTIC (EAR) AS NEEDED
COMMUNITY

## 2021-07-20 RX ORDER — AMLODIPINE BESYLATE 2.5 MG/1
2.5 TABLET ORAL DAILY
COMMUNITY

## 2021-07-20 NOTE — PERIOP NOTES
Patient verified name and     Order for consent NOT found in EHR at time of PAT visit. Unable to verify case posting against order; surgery verified by patient. Type 1B surgery, walk in assessment complete. Labs per surgeon: none ordered  Labs per anesthesia protocol: Hgb; results pending  EKG: not indicated    Patient COVID test date 21 at [address] [place] [address], patient confirmed appointment The testing center is located at the . [address] , [place]. If appointment is needed patient provided telephone number of [phone]. Hospital approved surgical skin cleanser and instructions given per hospital policy. Patient provided with and instructed on educational handouts including Guide to Surgery, Pain Management, Hand Hygiene, Blood Transfusion Education, and [facility] Brochure. Patient answered medical/surgical history questions at their best of ability. All prior to admission medications documented in [facility]. Medication list visualized during patient appointment. Patient instructed to hold all vitamins 7 days prior to surgery and NSAIDS 5 days prior to surgery, patient verbalized understanding. Patient teach back successful and patient demonstrates knowledge of instructions.

## 2021-07-20 NOTE — PERIOP NOTES
PLEASE CONTINUE TAKING ALL PRESCRIPTION MEDICATIONS UP TO THE DAY OF SURGERY UNLESS OTHERWISE DIRECTED BELOW. DISCONTINUE all vitamins and supplements 7 days prior to surgery. DISCONTINUE Non-Steriodal Anti-Inflammatory (NSAIDS) such as Advil and Aleve, goody powder 5 days prior to surgery. Home Medications to take  the day of surgery    clonazepam- klonopin if needed   topiramate-topamax   Cetirizine-zyrtec    Atorvastatin-lipitor    Use albuterol inhaler if needed and bring to the hospital    Amlodipine-norvasc    Montelukast-singulair    hydrocone-acetaminophen- norco    Pantoprazole-protonix    vilazodone-viibryd       Home Medications   to Hold   Vitamins, Supplements, and Herbals. Non-Steriodal Anti-Inflammatory (NSAIDS) such as Advil, goody powder and Aleve. Hold aspirin 81 mg (preventatitve) 5 days prior to surgery   Hold the morning of surgery:  Lisinopril, cyclobenzaprine (flexeril)     Comments    Covid test 7/23/21 at [address]  @ [address] [facility] [place], [place]              Please do not bring home medications with you on the day of surgery unless otherwise directed by your nurse. If you are instructed to bring home medications, please give them to your nurse as they will be administered by the nursing staff. If you have any questions, please call [facility] [phone] or [facility] [phone]. A copy of this note was provided to the patient for reference.

## 2021-07-20 NOTE — PERIOP NOTES
Recent Results (from the past 12 hour(s))   HEMOGLOBIN    Collection Time: 07/20/21 12:02 PM   Result Value Ref Range    HGB 13.8 11.7 - 15.4 g/dL     reviewed

## 2021-07-23 NOTE — H&P (VIEW-ONLY)
Name: Nito Burris  YOB: 1960  Gender: female  MRN: [id number]      CC: No chief complaint on file. HPI: Nito Burris is a 64 y.o. female who presents with No chief complaint on file. .  Returns for preop appointment for left shoulder continues to have shoulder pain and stiffness. Current Outpatient Medications:     oxyCODONE IR (ROXICODONE) 5 mg immediate release tablet, Take 1-2 Tablets by mouth every six (6) hours as needed for Pain for up to 14 days. Max Daily Amount: 40 mg., Disp: 40 Tablet, Rfl: 0    ondansetron (ZOFRAN ODT) 4 mg disintegrating tablet, Take 1 Tablet by mouth every six (6) hours as needed for Nausea or Vomiting., Disp: 20 Tablet, Rfl: 0    amLODIPine (NORVASC) 2.5 mg tablet, Take 2.5 mg by mouth daily. Prescribed 7/19/21, will start when arrives [facility], Disp: , Rfl:     Aspirin-Acetaminophen-Caffeine (Goody's Extra Strength) 500-325-65 mg pwpk, Take  by mouth as needed. , Disp: , Rfl:     topiramate (TOPAMAX) 100 mg tablet, 100 mg two (2) times a day., Disp: , Rfl:     HYDROcodone-acetaminophen (NORCO) 7.5-325 mg per tablet, every eight (8) hours as needed. , Disp: , Rfl:     gabapentin (NEURONTIN) 100 mg capsule, nightly., Disp: , Rfl:     folic acid (FOLVITE) 1 mg tablet, daily. , Disp: , Rfl:     cholecalciferol (VITAMIN D3) (5000 Units/125 mcg) tab tablet, Take  by mouth daily. (Patient not taking: Reported on 7/20/2021), Disp: , Rfl:     aspirin delayed-release 81 mg tablet, Take 81 mg by mouth daily. Denies CAD, CVA, DVT- preventative, Disp: , Rfl:     vilazodone (VIIBRYD) 20 mg tab tablet, Take 20 mg by mouth every morning., Disp: , Rfl:     albuterol (PROVENTIL VENTOLIN) 2.5 mg /3 mL (0.083 %) nebu, daily as needed. , Disp: , Rfl:     atorvastatin (LIPITOR) 40 mg tablet, Take 40 mg by mouth every morning., Disp: , Rfl:     cetirizine (ZYRTEC) 10 mg tablet, Take 1 Tab by mouth daily. , Disp: , Rfl:     clonazePAM (KLONOPIN) 0.5 mg tablet, Take 1 Tab by mouth three (3) times daily as needed. , Disp: , Rfl:     cyanocobalamin 1,000 mcg tablet, Take 1,000 mcg by mouth daily. , Disp: , Rfl:     cyclobenzaprine (FLEXERIL) 5 mg tablet, Take 5 mg by mouth daily as needed. , Disp: , Rfl:     lisinopril (PRINIVIL, ZESTRIL) 20 mg tablet, Take 20 mg by mouth daily. , Disp: , Rfl:     loperamide (IMODIUM) 2 mg capsule, Take 1 Cap by mouth as needed. , Disp: , Rfl:     montelukast (SINGULAIR) 10 mg tablet, Take 10 mg by mouth daily. , Disp: , Rfl:     olopatadine (PATANOL) 0.1 % ophthalmic solution, Apply 1 Drop to eye two (2) times daily as needed. , Disp: , Rfl:     pantoprazole (PROTONIX) 40 mg tablet, Take 40 mg by mouth daily. , Disp: , Rfl:     albuterol (VENTOLIN HFA) 90 mcg/actuation inhaler, Take 2 Puffs by inhalation every six (6) hours as needed for Wheezing., Disp: , Rfl:   Allergies   Allergen Reactions    Latex, Natural Rubber Itching     RASH      Banana Nausea Only    Bee Sting [Sting, Bee] Angioedema     Swelling of tongue    Grass Pollen Shortness of Breath     ragweeds    Penicillins Rash and Swelling     other      Soap Rash     Dial Soap specifically    Tree Pollen-Pecan Shortness of Breath     Trees     Past Medical History:   Diagnosis Date    Adverse effect of anesthesia     delayed awakening     Anemia     see hematologist, 2020 \"low platelets, low blood count- received iron infusions\"    Anxiety and depression     medication    Arthritis     OA major joints and spine    Asthma     nebulizer & rescue inhaler prn- stress induced    Chronic pain     neck, back and major joints    GERD (gastroesophageal reflux disease)     medication    Hypertension     managed with meds    PONV (postoperative nausea and vomiting)     zofran works well per patient    Seizures ([address] 01/2021    neurologist diagnosed a seizue.  last seizure in January 2021    Smoker     0.75 ppd x since age 44 per patient     Past Surgical History:   Procedure Laterality Date    HX APPENDECTOMY      HX  SECTION      HX COLONOSCOPY      HX HYSTERECTOMY      HX SHOULDER ARTHROSCOPY Right 2019    SLAp repair and clavical resection    HX TONSILLECTOMY  1967    NEUROLOGICAL PROCEDURE UNLISTED  2019    neck surgery     Family History   Problem Relation Age of Onset    Cancer Mother         Leukemia    Hypertension Mother     Heart Disease Father     Heart Surgery Father         CABG x 4 v    No Known Problems Sister     No Known Problems Brother      Social History     Socioeconomic History    Marital status:      Spouse name: Not on file    Number of children: Not on file    Years of education: Not on file    Highest education level: Not on file   Occupational History    Not on file   Tobacco Use    Smoking status: Current Every Day Smoker     Packs/day: 1.00     Years: 20.00     Pack years: 20.00    Smokeless tobacco: Never Used   Vaping Use    Vaping Use: Former    Substances: Nicotine, Flavoring    Devices: Pre-filled or refillable cartridge, Refillable tank   Substance and Sexual Activity    Alcohol use: Not Currently    Drug use: Not Currently    Sexual activity: Not on file   Other Topics Concern    Not on file   Social History Narrative    Not on file     Social Determinants of Health     Financial Resource Strain:     Difficulty of Paying Living Expenses:    Food Insecurity:     Worried About Running Out of Food in the Last Year:     [address] in the Last Year:    Transportation Needs:     Lack of Transportation (Medical):      Lack of Transportation (Non-Medical):    Physical Activity:     Days of Exercise per Week:     Minutes of Exercise per Session:    Stress:     Feeling of Stress :    Social Connections:     Frequency of Communication with Friends and Family:     Frequency of Social Gatherings with Friends and Family:     Attends [employer] Services:     Active Member of Clubs or Organizations:     Attends Club or Organization Meetings:     Marital Status:    Intimate Partner Violence:     Fear of Current or Ex-Partner:     Emotionally Abused:     Physically Abused:     Sexually Abused:      Physical Examination:  General: no acute distress  Lungs: breathing easily  CV: regular rhythm by pulse  Left Shoulder: Active elevation to about 100 degrees passively to about 140 limited by pain tenderness [facility] the [facility] joint and the biceps tendon pain with speeds and [facility] positive impingement signs      Imaging:   See my previous note with degenerative's biceps labral signal and AC joint changes no obvious full-thickness rotator cuff tear          Assessment:     ICD-10-CM ICD-9-CM   1. Superior glenoid labrum lesion of left shoulder, initial encounter  S43.432A 840.7   2. Biceps tendinitis of left shoulder  M75.22 726.12   3. Impingement syndrome of left shoulder  M75.42 726.2        Plan: We reviewed the details risk and benefits of shoulder arthroscopy. Surgical plan to be for left shoulder arthroscopy biceps tenotomy possible lysis of adhesions/capsular release subacromial decompression and distal clavicle resection. We discussed the details risks and benefits of the shoulder arthroscopy including but not limited to anesthetic complications bleeding infection postoperative numbness stiffness continued pain incomplete resolution of symptoms and possible need for further surgery as well as the extensive rehab course associated with the procedure. All of the patient's questions have been answered and they elect to proceed as planned did discuss specifically I'm concerned about stiffness in her       Follow up         Tristan Aaron MD, [address] [place] [address] and Sports Medicine

## 2021-07-26 ENCOUNTER — ANESTHESIA EVENT (OUTPATIENT)
Dept: SURGERY | Age: 61
End: 2021-07-26
Payer: OTHER GOVERNMENT

## 2021-07-27 ENCOUNTER — HOSPITAL ENCOUNTER (OUTPATIENT)
Age: 61
Setting detail: OUTPATIENT SURGERY
Discharge: HOME | End: 2021-07-27
Attending: ORTHOPAEDIC SURGERY | Admitting: ORTHOPAEDIC SURGERY
Payer: OTHER GOVERNMENT

## 2021-07-27 ENCOUNTER — ANESTHESIA (OUTPATIENT)
Dept: SURGERY | Age: 61
End: 2021-07-27
Payer: OTHER GOVERNMENT

## 2021-07-27 VITALS
BODY MASS INDEX: 24.24 KG/M2 | RESPIRATION RATE: 16 BRPM | TEMPERATURE: 97.3 F | WEIGHT: 142 LBS | SYSTOLIC BLOOD PRESSURE: 111 MMHG | OXYGEN SATURATION: 95 % | DIASTOLIC BLOOD PRESSURE: 53 MMHG | HEART RATE: 75 BPM | HEIGHT: 64 IN

## 2021-07-27 LAB
COVID-19 RAPID TEST, COVR: NOT DETECTED
SARS-COV-2, COV2: NORMAL
SOURCE, COVRS: NORMAL

## 2021-07-27 PROCEDURE — 29826 SHO ARTHRS SRG DECOMPRESSION: CPT | Performed by: ORTHOPAEDIC SURGERY

## 2021-07-27 PROCEDURE — 2709999900 HC NON-CHARGEABLE SUPPLY: Performed by: ORTHOPAEDIC SURGERY

## 2021-07-27 PROCEDURE — 76060000034 HC ANESTHESIA 1.5 TO 2 HR: Performed by: ORTHOPAEDIC SURGERY

## 2021-07-27 PROCEDURE — 74011000250 HC RX REV CODE- 250: Performed by: NURSE ANESTHETIST, CERTIFIED REGISTERED

## 2021-07-27 PROCEDURE — 76210000020 HC REC RM PH II FIRST 0.5 HR: Performed by: ORTHOPAEDIC SURGERY

## 2021-07-27 PROCEDURE — 77030039425 HC BLD LARYNG TRULITE DISP TELE -A: Performed by: ANESTHESIOLOGY

## 2021-07-27 PROCEDURE — 29823 SHO ARTHRS SRG XTNSV DBRDMT: CPT | Performed by: ORTHOPAEDIC SURGERY

## 2021-07-27 PROCEDURE — 74011250636 HC RX REV CODE- 250/636: Performed by: NURSE ANESTHETIST, CERTIFIED REGISTERED

## 2021-07-27 PROCEDURE — 74011250636 HC RX REV CODE- 250/636: Performed by: SPECIALIST/TECHNOLOGIST

## 2021-07-27 PROCEDURE — 77030037088 HC TUBE ENDOTRACH ORAL NSL COVD-A: Performed by: ANESTHESIOLOGY

## 2021-07-27 PROCEDURE — 77030003602 HC NDL NRV BLK BBMI -B: Performed by: ANESTHESIOLOGY

## 2021-07-27 PROCEDURE — 29824 SHO ARTHRS SRG DSTL CLAVICLC: CPT | Performed by: ORTHOPAEDIC SURGERY

## 2021-07-27 PROCEDURE — 77030004453 HC BUR SHV STRY -B: Performed by: ORTHOPAEDIC SURGERY

## 2021-07-27 PROCEDURE — 77030033005 HC TBNG ARTHSC PMP STRY -B: Performed by: ORTHOPAEDIC SURGERY

## 2021-07-27 PROCEDURE — 77030002933 HC SUT MCRYL J&J -A: Performed by: ORTHOPAEDIC SURGERY

## 2021-07-27 PROCEDURE — 77030040361 HC SLV COMPR DVT MDII -B: Performed by: ORTHOPAEDIC SURGERY

## 2021-07-27 PROCEDURE — 74011250636 HC RX REV CODE- 250/636: Performed by: ORTHOPAEDIC SURGERY

## 2021-07-27 PROCEDURE — 77030018673: Performed by: ORTHOPAEDIC SURGERY

## 2021-07-27 PROCEDURE — 74011250636 HC RX REV CODE- 250/636: Performed by: ANESTHESIOLOGY

## 2021-07-27 PROCEDURE — 87635 SARS-COV-2 COVID-19 AMP PRB: CPT

## 2021-07-27 PROCEDURE — 77030006872 HC BLD SHV CUTT STRY -B: Performed by: ORTHOPAEDIC SURGERY

## 2021-07-27 PROCEDURE — 76210000006 HC OR PH I REC 0.5 TO 1 HR: Performed by: ORTHOPAEDIC SURGERY

## 2021-07-27 PROCEDURE — 76010010054 HC POST OP PAIN BLOCK: Performed by: ORTHOPAEDIC SURGERY

## 2021-07-27 PROCEDURE — 77030027384 HC PRB ARTHSCP SERFAS STRY -C: Performed by: ORTHOPAEDIC SURGERY

## 2021-07-27 PROCEDURE — 29826 SHO ARTHRS SRG DECOMPRESSION: CPT | Performed by: SPECIALIST/TECHNOLOGIST

## 2021-07-27 PROCEDURE — 29824 SHO ARTHRS SRG DSTL CLAVICLC: CPT | Performed by: SPECIALIST/TECHNOLOGIST

## 2021-07-27 PROCEDURE — 74011250637 HC RX REV CODE- 250/637: Performed by: ANESTHESIOLOGY

## 2021-07-27 PROCEDURE — 29823 SHO ARTHRS SRG XTNSV DBRDMT: CPT | Performed by: SPECIALIST/TECHNOLOGIST

## 2021-07-27 PROCEDURE — 77030010428: Performed by: ORTHOPAEDIC SURGERY

## 2021-07-27 PROCEDURE — 76942 ECHO GUIDE FOR BIOPSY: CPT | Performed by: ORTHOPAEDIC SURGERY

## 2021-07-27 PROCEDURE — 76010000162 HC OR TIME 1.5 TO 2 HR INTENSV-TIER 1: Performed by: ORTHOPAEDIC SURGERY

## 2021-07-27 PROCEDURE — 77030040922 HC BLNKT HYPOTHRM STRY -A: Performed by: ANESTHESIOLOGY

## 2021-07-27 RX ORDER — FENTANYL CITRATE 50 UG/ML
100 INJECTION, SOLUTION INTRAMUSCULAR; INTRAVENOUS ONCE
Status: COMPLETED | OUTPATIENT
Start: 2021-07-27 | End: 2021-07-27

## 2021-07-27 RX ORDER — ONDANSETRON 2 MG/ML
INJECTION INTRAMUSCULAR; INTRAVENOUS AS NEEDED
Status: DISCONTINUED | OUTPATIENT
Start: 2021-07-27 | End: 2021-07-27 | Stop reason: HOSPADM

## 2021-07-27 RX ORDER — SODIUM CHLORIDE, SODIUM LACTATE, POTASSIUM CHLORIDE, CALCIUM CHLORIDE 600; 310; 30; 20 MG/100ML; MG/100ML; MG/100ML; MG/100ML
100 INJECTION, SOLUTION INTRAVENOUS CONTINUOUS
Status: DISCONTINUED | OUTPATIENT
Start: 2021-07-27 | End: 2021-07-27 | Stop reason: HOSPADM

## 2021-07-27 RX ORDER — NALOXONE HYDROCHLORIDE 0.4 MG/ML
0.1 INJECTION, SOLUTION INTRAMUSCULAR; INTRAVENOUS; SUBCUTANEOUS AS NEEDED
Status: DISCONTINUED | OUTPATIENT
Start: 2021-07-27 | End: 2021-07-27 | Stop reason: HOSPADM

## 2021-07-27 RX ORDER — ACETAMINOPHEN 500 MG
1000 TABLET ORAL ONCE
Status: COMPLETED | OUTPATIENT
Start: 2021-07-27 | End: 2021-07-27

## 2021-07-27 RX ORDER — PROPOFOL 10 MG/ML
INJECTION, EMULSION INTRAVENOUS AS NEEDED
Status: DISCONTINUED | OUTPATIENT
Start: 2021-07-27 | End: 2021-07-27 | Stop reason: HOSPADM

## 2021-07-27 RX ORDER — ROPIVACAINE HYDROCHLORIDE 5 MG/ML
INJECTION, SOLUTION EPIDURAL; INFILTRATION; PERINEURAL
Status: COMPLETED | OUTPATIENT
Start: 2021-07-27 | End: 2021-07-27

## 2021-07-27 RX ORDER — HYDROMORPHONE HYDROCHLORIDE 2 MG/ML
0.5 INJECTION, SOLUTION INTRAMUSCULAR; INTRAVENOUS; SUBCUTANEOUS
Status: DISCONTINUED | OUTPATIENT
Start: 2021-07-27 | End: 2021-07-27 | Stop reason: HOSPADM

## 2021-07-27 RX ORDER — MIDAZOLAM HYDROCHLORIDE 1 MG/ML
2 INJECTION, SOLUTION INTRAMUSCULAR; INTRAVENOUS
Status: DISCONTINUED | OUTPATIENT
Start: 2021-07-27 | End: 2021-07-27 | Stop reason: HOSPADM

## 2021-07-27 RX ORDER — OXYCODONE HYDROCHLORIDE 5 MG/1
5 TABLET ORAL
Status: DISCONTINUED | OUTPATIENT
Start: 2021-07-27 | End: 2021-07-27 | Stop reason: HOSPADM

## 2021-07-27 RX ORDER — CEFAZOLIN SODIUM/WATER 2 G/20 ML
2 SYRINGE (ML) INTRAVENOUS ONCE
Status: COMPLETED | OUTPATIENT
Start: 2021-07-27 | End: 2021-07-27

## 2021-07-27 RX ORDER — DEXAMETHASONE SODIUM PHOSPHATE 4 MG/ML
INJECTION, SOLUTION INTRA-ARTICULAR; INTRALESIONAL; INTRAMUSCULAR; INTRAVENOUS; SOFT TISSUE AS NEEDED
Status: DISCONTINUED | OUTPATIENT
Start: 2021-07-27 | End: 2021-07-27 | Stop reason: HOSPADM

## 2021-07-27 RX ORDER — LIDOCAINE HYDROCHLORIDE 20 MG/ML
INJECTION, SOLUTION EPIDURAL; INFILTRATION; INTRACAUDAL; PERINEURAL AS NEEDED
Status: DISCONTINUED | OUTPATIENT
Start: 2021-07-27 | End: 2021-07-27 | Stop reason: HOSPADM

## 2021-07-27 RX ORDER — ROCURONIUM BROMIDE 10 MG/ML
INJECTION, SOLUTION INTRAVENOUS AS NEEDED
Status: DISCONTINUED | OUTPATIENT
Start: 2021-07-27 | End: 2021-07-27 | Stop reason: HOSPADM

## 2021-07-27 RX ORDER — EPHEDRINE SULFATE/0.9% NACL/PF 50 MG/5 ML
SYRINGE (ML) INTRAVENOUS AS NEEDED
Status: DISCONTINUED | OUTPATIENT
Start: 2021-07-27 | End: 2021-07-27 | Stop reason: HOSPADM

## 2021-07-27 RX ORDER — LIDOCAINE HYDROCHLORIDE 10 MG/ML
0.1 INJECTION INFILTRATION; PERINEURAL AS NEEDED
Status: DISCONTINUED | OUTPATIENT
Start: 2021-07-27 | End: 2021-07-27 | Stop reason: HOSPADM

## 2021-07-27 RX ORDER — MIDAZOLAM HYDROCHLORIDE 1 MG/ML
2 INJECTION, SOLUTION INTRAMUSCULAR; INTRAVENOUS ONCE
Status: COMPLETED | OUTPATIENT
Start: 2021-07-27 | End: 2021-07-27

## 2021-07-27 RX ORDER — SUCCINYLCHOLINE CHLORIDE 20 MG/ML
INJECTION INTRAMUSCULAR; INTRAVENOUS AS NEEDED
Status: DISCONTINUED | OUTPATIENT
Start: 2021-07-27 | End: 2021-07-27 | Stop reason: HOSPADM

## 2021-07-27 RX ORDER — SODIUM CHLORIDE 0.9 % (FLUSH) 0.9 %
5-40 SYRINGE (ML) INJECTION AS NEEDED
Status: DISCONTINUED | OUTPATIENT
Start: 2021-07-27 | End: 2021-07-27 | Stop reason: HOSPADM

## 2021-07-27 RX ORDER — SODIUM CHLORIDE 0.9 % (FLUSH) 0.9 %
5-40 SYRINGE (ML) INJECTION EVERY 8 HOURS
Status: DISCONTINUED | OUTPATIENT
Start: 2021-07-27 | End: 2021-07-27 | Stop reason: HOSPADM

## 2021-07-27 RX ORDER — OXYCODONE HYDROCHLORIDE 5 MG/1
10 TABLET ORAL
Status: DISCONTINUED | OUTPATIENT
Start: 2021-07-27 | End: 2021-07-27 | Stop reason: HOSPADM

## 2021-07-27 RX ORDER — ONDANSETRON 2 MG/ML
4 INJECTION INTRAMUSCULAR; INTRAVENOUS ONCE
Status: DISCONTINUED | OUTPATIENT
Start: 2021-07-27 | End: 2021-07-27 | Stop reason: HOSPADM

## 2021-07-27 RX ORDER — DIPHENHYDRAMINE HYDROCHLORIDE 50 MG/ML
12.5 INJECTION, SOLUTION INTRAMUSCULAR; INTRAVENOUS ONCE
Status: DISCONTINUED | OUTPATIENT
Start: 2021-07-27 | End: 2021-07-27 | Stop reason: HOSPADM

## 2021-07-27 RX ORDER — EPINEPHRINE 1 MG/ML
INJECTION, SOLUTION, CONCENTRATE INTRAVENOUS AS NEEDED
Status: DISCONTINUED | OUTPATIENT
Start: 2021-07-27 | End: 2021-07-27 | Stop reason: HOSPADM

## 2021-07-27 RX ORDER — SODIUM CHLORIDE, SODIUM LACTATE, POTASSIUM CHLORIDE, CALCIUM CHLORIDE 600; 310; 30; 20 MG/100ML; MG/100ML; MG/100ML; MG/100ML
75 INJECTION, SOLUTION INTRAVENOUS CONTINUOUS
Status: DISCONTINUED | OUTPATIENT
Start: 2021-07-27 | End: 2021-07-27 | Stop reason: HOSPADM

## 2021-07-27 RX ADMIN — Medication 10 MG: at 10:52

## 2021-07-27 RX ADMIN — PROPOFOL 150 MG: 10 INJECTION, EMULSION INTRAVENOUS at 10:51

## 2021-07-27 RX ADMIN — FENTANYL CITRATE 100 MCG: 50 INJECTION, SOLUTION INTRAMUSCULAR; INTRAVENOUS at 08:28

## 2021-07-27 RX ADMIN — PHENYLEPHRINE HYDROCHLORIDE 100 MCG: 10 INJECTION INTRAVENOUS at 11:42

## 2021-07-27 RX ADMIN — ROCURONIUM BROMIDE 10 MG: 10 INJECTION, SOLUTION INTRAVENOUS at 10:51

## 2021-07-27 RX ADMIN — MIDAZOLAM 2 MG: 1 INJECTION INTRAMUSCULAR; INTRAVENOUS at 08:28

## 2021-07-27 RX ADMIN — PHENYLEPHRINE HYDROCHLORIDE 50 MCG: 10 INJECTION INTRAVENOUS at 11:57

## 2021-07-27 RX ADMIN — ONDANSETRON 4 MG: 2 INJECTION INTRAMUSCULAR; INTRAVENOUS at 10:44

## 2021-07-27 RX ADMIN — PHENYLEPHRINE HYDROCHLORIDE 50 MCG: 10 INJECTION INTRAVENOUS at 11:33

## 2021-07-27 RX ADMIN — SODIUM CHLORIDE, SODIUM LACTATE, POTASSIUM CHLORIDE, AND CALCIUM CHLORIDE: 600; 310; 30; 20 INJECTION, SOLUTION INTRAVENOUS at 10:39

## 2021-07-27 RX ADMIN — LIDOCAINE HYDROCHLORIDE 60 MG: 20 INJECTION, SOLUTION EPIDURAL; INFILTRATION; INTRACAUDAL; PERINEURAL at 10:51

## 2021-07-27 RX ADMIN — DEXAMETHASONE SODIUM PHOSPHATE 4 MG: 4 INJECTION, SOLUTION INTRAMUSCULAR; INTRAVENOUS at 11:38

## 2021-07-27 RX ADMIN — SUCCINYLCHOLINE CHLORIDE 120 MG: 20 INJECTION, SOLUTION INTRAMUSCULAR; INTRAVENOUS at 10:51

## 2021-07-27 RX ADMIN — CEFAZOLIN 2 G: 1 INJECTION, POWDER, FOR SOLUTION INTRAVENOUS at 10:39

## 2021-07-27 RX ADMIN — ACETAMINOPHEN 1000 MG: 500 TABLET ORAL at 08:25

## 2021-07-27 RX ADMIN — PHENYLEPHRINE HYDROCHLORIDE 50 MCG: 10 INJECTION INTRAVENOUS at 11:19

## 2021-07-27 RX ADMIN — ROPIVACAINE HYDROCHLORIDE 30 ML: 5 INJECTION, SOLUTION EPIDURAL; INFILTRATION; PERINEURAL at 08:35

## 2021-07-27 RX ADMIN — PHENYLEPHRINE HYDROCHLORIDE 50 MCG: 10 INJECTION INTRAVENOUS at 11:12

## 2021-07-27 RX ADMIN — PHENYLEPHRINE HYDROCHLORIDE 200 MCG: 10 INJECTION INTRAVENOUS at 11:01

## 2021-07-27 NOTE — ANESTHESIA PROCEDURE NOTES
Peripheral Block    Performed by: Jos Tanner MD  Authorized by: Jos Tanner MD       Pre-procedure:    Indications: at surgeon's request, post-op pain management and procedure for pain    Preanesthetic Checklist: patient identified, risks and benefits discussed, site marked, timeout performed, anesthesia consent given and patient being monitored      Block Type:   Block Type:  Femoral single shot  Monitoring:  Standard ASA monitoring, responsive to questions, continuous pulse ox, oxygen, frequent vital sign checks and heart rate  Injection Technique:  Single shot  Procedures: ultrasound guided and nerve stimulator    Patient Position: supine  Prep: chlorhexidine    Location:  Upper thigh  Needle Type:  Stimuplex  Needle Gauge:  22 G  Needle Localization:  Nerve stimulator and ultrasound guidance  Motor Response comment:   Motor Response: minimal motor response >0.4 mA     Assessment:  Number of attempts:  1  Injection Assessment:  Incremental injection every 5 mL, no paresthesia, ultrasound image on chart, no intravascular symptoms, negative aspiration for blood and local visualized surrounding nerve on ultrasound  Patient tolerance:  Patient tolerated the procedure well with no immediate complications

## 2021-07-27 NOTE — ANESTHESIA PROCEDURE NOTES
Peripheral Block    Performed by: Alexey Morel MD  Authorized by: Alexey Morel MD       Pre-procedure:    Indications: at surgeon's request, post-op pain management and procedure for pain    Preanesthetic Checklist: patient identified, risks and benefits discussed, site marked, timeout performed, anesthesia consent given and patient being monitored      Block Type:   Block Type:  TAP  Monitoring:  Standard ASA monitoring, continuous pulse ox, frequent vital sign checks, heart rate and oxygen  Injection Technique:  Single shot  Procedures: ultrasound guided and nerve stimulator    Patient Position: supine  Prep: chlorhexidine    Location:  Abdominal  Needle Type:  Stimuplex  Needle Gauge:  22 G  Needle Localization:  Ultrasound guidance  Motor Response comment:   Motor Response: minimal motor response >0.4 mA     Assessment:  Number of attempts:  1  Injection Assessment:  Incremental injection every 5 mL, negative aspiration for blood, no intravascular symptoms and ultrasound image on chart  Patient tolerance:  Patient tolerated the procedure well with no immediate complications

## 2021-07-27 NOTE — ANESTHESIA PROCEDURE NOTES
Peripheral Block    Start time: 7/27/2021 8:28 AM  End time: 7/27/2021 8:33 AM  Performed by: Doron Medina MD  Authorized by: Doron Medina MD       Pre-procedure: Indications: at surgeon's request, post-op pain management and procedure for pain    Preanesthetic Checklist: patient identified, risks and benefits discussed, site marked, timeout performed, anesthesia consent given and patient being monitored    Timeout Time: 08:28 EDT          Block Type:   Block Type:   Interscalene  Laterality:  Left  Monitoring:  Standard ASA monitoring, responsive to questions, oxygen, continuous pulse ox, frequent vital sign checks and heart rate  Injection Technique:  Single shot  Procedures: ultrasound guided and nerve stimulator    Patient Position: seated  Prep: chlorhexidine    Location:  Interscalene  Needle Type:  Stimuplex  Needle Gauge:  22 G  Needle Localization:  Ultrasound guidance and nerve stimulator  Motor Response comment:   Motor Response: minimal motor response >0.4 mA   Medication Injected:  Ropivacaine (PF) (NAROPIN)(0.5%) 5 mg/mL injection, 30 mL  Med Admin Time: 7/27/2021 8:35 AM    Assessment:  Number of attempts:  1  Injection Assessment:  Incremental injection every 5 mL, no paresthesia, negative aspiration for CSF, local visualized surrounding nerve on ultrasound, negative aspiration for blood, no intravascular symptoms and ultrasound image on chart  Patient tolerance:  Patient tolerated the procedure well with no immediate complications

## 2021-07-27 NOTE — ANESTHESIA POSTPROCEDURE EVALUATION
Procedure(s):  LEFT SHOULDER ARTHROSCOPY DISTAL CLAVICLE RESECTION, SUBACROMIAL DECOMPRESSION AND DEBRIDEMENT. general    Anesthesia Post Evaluation      Multimodal analgesia: multimodal analgesia used between 6 hours prior to anesthesia start to PACU discharge  Patient location during evaluation: bedside  Patient participation: complete - patient participated  Level of consciousness: responsive to verbal stimuli  Pain management: adequate  Airway patency: patent  Anesthetic complications: no  Cardiovascular status: hemodynamically stable  Respiratory status: spontaneous ventilation  Hydration status: stable    Final Post Anesthesia Temperature Assessment:  Normothermia (36.0-37.5 degrees C)      INITIAL Post-op Vital signs:   Vitals Value Taken Time   /53 07/27/21 1255   Temp 36.3 °C (97.4 °F) 07/27/21 1222   Pulse 73 07/27/21 1255   Resp 18 07/27/21 1245   SpO2 93 % 07/27/21 1255   Vitals shown include unvalidated device data.

## 2021-07-27 NOTE — OP NOTES
Operative Report    Patient: Joan Lemus MRN: [id number]  SSN: [id number]    YOB: 1960  Age: 64 y.o. Sex: female       Date of Surgery: 7/27/2021     Preoperative Diagnosis:   1) left shoulder AC joint arthritis   2) left shoulder outlet impingement  3) left shoulder biceps tendinitis/superior labral tear  4) left shoulder adhesive capsulitis    Postoperative Diagnosis: Same as above     Surgeon(s) and Role:     * Nargis Fowler MD - Primary    Assistants: Ge Wild NP    Anesthesia: General + ISB block    Procedure: 1) left shoulder arthroscopy distal clavicle resection  2) left shoulder Arthroscopy with subacromial decompression  3) left shoulder arthroscopy with extensive debridement including biceps tenotomy, rotator interval, anterior and inferior capsule posterior and inferior capsule, superior labrum, anterior labrum. 4) left shoulder manipulation under anesthesia      Estimated Blood Loss:  5 mL      Implants: None           Specimens: * No specimens in log *        Drains: None                Complications: None    Counts: Sponge and needle counts were correct times two. Procedure in Detail: After informed consent was obtained the surgical site was marked in the preoperative area by myself and the patient was brought to the operating room and general anesthesia was induced. The patient was positioned in the beachchair position with all bony prominences well-padded and the operative shoulder was prepped and draped in the usual orthopedic sterile fashion. Timeout was performed per protocol antibiotics given per protocol. Examination under anesthesia revealed passive elevation to about 105 degrees with a pretty firm endpoint. Only about 30 degrees arc of rotation.   Manipulation under anesthesia was performed stabilizing the scapula with constant steady pressure on the proximal humerus and was an audible and visible release of scar tissue and I was able to take the arm up to about 180 degrees of flexion we also manipulated and rotation and were able to get 90 degrees of internal rotation with her arm abducted at her side. Initially a standard posterior lateral arthroscopy viewing portal was established. Diagnostic arthroscopy was carried out. There was moderate degenerative changes of the articular surface of the humeral head and glenoid. The subscapularis was intact. Rotator interval had extensive synovitis and inflammation and thickening. Anterior interval portal was established under spinal needle guidance. RF device was used to release the rotator interval and the thick scar. There was a large thickened mid glenohumeral ligament which was also released with an RF device. We used the RF device to go down into the anterior and anterior inferior capsule which would then traumatically torn with a manipulation under anesthesia and this was released further under direct visualization taking care to protect the axillary nerve. Biceps tendon was pulled into the joint and had extensive tenosynovitis. Decision was made to proceed with biceps tenotomy using an RF device at the tendon was tenotomized just distal just to the biceps labral anchor and allowed to retract distally. Superior labrum was probed and demonstrated generative SLAP tearing. This was debrided back to a stable rim using a motorized shaver. Anterior labrum degenerative labral tearing which was debrided back to a stable rim using a motorized shaver. .  Undersurface of the infraspinatus and supraspinatus demonstrated no evidence of rotator cuff tearing. We used a switching stick and placed the camera in the anterior portal and work from the posterior portal and continued our capsular release down posterior inferiorly using an RF device to release the posterior and posterior inferior capsule preserving the labrum.      Arthroscope was then placed in the subacromial space there was an extensive amount of subacromial bursitis we established a lateral portal under spinal needle guidance and performed a subacromial bursectomy using a combination of an RF device and a motorized shaver. We exposed the undersurface of the acromion with an RF device. This revealed type II morphology with significant anterior inferior downsloping and a tight outlet. We then used a motorized bur from the lateral portal and performed a subacromial decompression resecting the anterior inferior downsloping and elevating this to create a flat elevated bleeding surface. Rotator cuff from the bursal side demonstrated no evidence of tearing. RF device was used to expose the distal clavicle which had osteophyte formation and narrowing of the AC joint. Visualizing from the lateral portal we work from the anterior portal and used a motorized bur to resect a few millimeters of medial acromion and then to resect about 8 mm of the distal clavicle creating about a centimeter of space at the [facility] joint. Shoulder was then drained portal sites were closed with buried Monocryl suture and Steri-Strips sterile dressing cryotherapy device and sling and abduction pillow were applied. Ms. James tolerated the procedure well was awakened and transferred to the PACU in stable condition    Discharge plan:  Disposition: Home  Rehab protocol: Range of motion as tolerated with aggressive early physical therapy to prevent recurrent adhesive capsulitis         Signed By:  Vijay Benton MD     July 27, 2021

## 2021-07-27 NOTE — INTERVAL H&P NOTE
Update History & Physical    The Patient's History and Physical was reviewed   I discussed the surgery and patients medical condition with the patient. The chart was reviewed with the patient and I examined the patient. There was no change from previous note. The surgical site was confirmed by the patient and me. CV: RRR  RESP: CTAB    Plan:  The risk, benefits, expected outcome, and alternative to the recommended procedure have been discussed with the patient. Patient understands and elects to proceed with the procedure as planned.     Electronically signed by Delmi Merida MD on 07/27/21 10:12 AM

## 2021-07-27 NOTE — PERIOP NOTES
at patient's bedside. Discharge instructions reviewed with patient and her .  They voice understanding at this time with no questions or concerns

## 2021-07-27 NOTE — DISCHARGE INSTRUCTIONS
Post-op instructions for Shoulder Arthroscopy Without Repair (Dr. Niles Hussein)    Day of Surgery:     DIET:   Begin with liquids and light foods (jell-o, soup, etc.). Progress to your normal diet if you are not nauseated. MEDICATION:   1. Pain- Norco (hydrocodone/acetaminophen) or Oxycodone. If you are taking oxycodone, you may also take two (2) Tylenol (acetaminophen) 500mg tabs every eight (8) hours. Do not take Tylenol (acetaminophen) if you are given Norco as this medicine already contains acetaminophen. Do not drink alcohol while taking pain medication. Slowly wean off the pain medication as the pain improves. 2. Stool Softener-Pain medication can cause constipation. Be sure to drink plenty of water and take an over the counter stool softener as needed. ICE:  Do NOT put the ice machine directly on your skin. Use it frequently for the first 72 hours. You may also use a regular ice bag/pack for 20 minutes at a time. Place a thin layer of clothing or pillow case between ice pack and your skin. Ice for 20-30 minutes on and then 20-30 minutes off. If you are awake and the ice is not irritating or burning your skin you may leave it on longer. SHOWERING:  No showering. Leave bandages in place. If given a sling, wear it. ACTIVITY:  You may move your hand and wrist, opening and closing your fist.      First & Second Post-Op Day:    MEDICATION: Continue as instructed as above. BANDAGES: No showering. Keep bandage in place. EXERCISES: Begin with 3 sets of 10 of the following exercises:  1. Scapular retractions: squeeze shoulder blades together and hold for 1-2 seconds. 2. Flex / extend wrist.  3. Open / close fist.   4. Flex / extend elbow (you may remove the sling for this exercise). ICE: Continue to use the ice machine frequently as instructed above. Place a thin layer of clothing or pillow case between ice pack and your skin. Ice for 20-30 minutes on and then 20-30 minutes off.    If you are awake and the ice is not irritating or burning your skin you may leave it on longer. Third Post-Op Day:    MEDICATION:  Continue as instructed above. BANDAGES:   (after 72 hours/3 days): Remove outer bandages. Leave steri-strips in place. You may shower, but keep the incisions as dry as possible (cover with plastic wrap). It is best to sit down in the shower to avoid slipping. You may remove the sling for showers. EXERCISES:    Continue exercises as noted above. The sling is for comfort and you may gradually decrease the use of the sling. Avoid heavy lifting or repetitive use of the shoulder. Light range of motion is safe. You may progress out of the sling as pain allows over the next 1-2 weeks. ICE:   Continue to ice frequently eight with the ice machine or regular ice pack. Do not put it directly on your skin. Place a thin layer of clothing or pillow case between ice pack and your skin. Ice for 20-30 minutes on and then 20-30 minutes off. If you are awake and the ice is not irritating or burning your skin you may leave it on longer. PHYSICAL THERAPY APPOINTMENT:  Formal physical therapy typically begins 1-2 weeks post-op. SHOULDER RESPONSE TO SURGERY:    Your shoulder will be swollen. It may take a week or longer for this to resolve. It is common to notice bruising around the shoulder, upper arm, and into the elbow. Call with any problems or questions. [phone] if you have any questions or problems. Please contact us immediately if you notice fever greater than 101 degrees F, excessive bleeding, or drainage from the surgery site, calf pain, or shortness of breath. If you are calling after hours or on a weekend, you may receive a call back from the \"on call\" physician.

## 2021-07-27 NOTE — ANESTHESIA PREPROCEDURE EVALUATION
Relevant Problems   No relevant active problems       Anesthetic History     PONV          Review of Systems / Medical History  Patient summary reviewed and pertinent labs reviewed    Pulmonary    COPD: mild      Smoker  Asthma : well controlled       Neuro/Psych     seizures (1/21 last event): well controlled         Cardiovascular    Hypertension              Exercise tolerance: >4 METS     GI/Hepatic/Renal     GERD: well controlled    Renal disease: CRI       Endo/Other        Arthritis     Other Findings   Comments: ACDF           Physical Exam    Airway  Mallampati: II  TM Distance: 4 - 6 cm  Neck ROM: normal range of motion   Mouth opening: Normal     Cardiovascular  Regular rate and rhythm,  S1 and S2 normal,  no murmur, click, rub, or gallop             Dental    Dentition: Full upper dentures and Full lower dentures     Pulmonary  Breath sounds clear to auscultation               Abdominal         Other Findings            Anesthetic Plan    ASA: 3  Anesthesia type: general      Post-op pain plan if not by surgeon: peripheral nerve block single    Induction: Intravenous  Anesthetic plan and risks discussed with: Patient

## (undated) DEVICE — [RESECTOR CUTTER, ARTHROSCOPIC SHAVER BLADE,  DO NOT RESTERILIZE,  DO NOT USE IF PACKAGE IS DAMAGED,  KEEP DRY,  KEEP AWAY FROM SUNLIGHT]: Brand: FORMULA

## (undated) DEVICE — SPONGE TONSIL DBL LG 0.625 IN

## (undated) DEVICE — STRIP,CLOSURE,WOUND,MEDI-STRIP,1/2X4: Brand: MEDLINE

## (undated) DEVICE — AMD ANTIMICROBIAL GAUZE SPONGES,12 PLY USP TYPE VII, 0.2% POLYHEXAMETHYLENE BIGUANIDE HCI (PHMB): Brand: CURITY

## (undated) DEVICE — SUT PROL 3-0 18IN PS2 BLU --

## (undated) DEVICE — GARMENT,MEDLINE,DVT,INT,CALF,MED, GEN2: Brand: MEDLINE

## (undated) DEVICE — UNIVERSAL DRAPES: Brand: MEDLINE INDUSTRIES, INC.

## (undated) DEVICE — 3M™ TEGADERM™ TRANSPARENT FILM DRESSING FRAME STYLE, 1626W, 4 IN X 4-3/4 IN (10 CM X 12 CM), 50/CT 4CT/CASE: Brand: 3M™ TEGADERM™

## (undated) DEVICE — INSULATED BLADE ELECTRODE: Brand: EDGE

## (undated) DEVICE — PACK PROCEDURE SURG POST LAMINECTOMY CDS

## (undated) DEVICE — REM POLYHESIVE ADULT PATIENT RETURN ELECTRODE: Brand: VALLEYLAB

## (undated) DEVICE — KENDALL SCD EXPRESS SLEEVES, KNEE LENGTH, MEDIUM: Brand: KENDALL SCD

## (undated) DEVICE — INTENDED FOR TISSUE SEPARATION, AND OTHER PROCEDURES THAT REQUIRE A SHARP SURGICAL BLADE TO PUNCTURE OR CUT.: Brand: BARD-PARKER SAFETY BLADES SIZE 10, STERILE

## (undated) DEVICE — INFLOW CASSETTE TUBING, DO NOT USE IF PACKAGE IS DAMAGED: Brand: CROSSFLOW

## (undated) DEVICE — MANIFOLD CART ULT HI FLOW W 3 PRT FOR SUCT

## (undated) DEVICE — WAX SURG 2.5GM HEMSTAT BNE BEESWAX PARAFFIN ISO PALMITATE

## (undated) DEVICE — DRAPE MICSCP W51XL150IN FOR LEICA M680 WILD OHS

## (undated) DEVICE — SOLUTION IV 1000ML 0.9% SOD CHL

## (undated) DEVICE — 1010 S-DRAPE TOWEL DRAPE 10/BX: Brand: STERI-DRAPE™

## (undated) DEVICE — UNIV CANN 5MM/76MM LTX FREE (10) BLUE

## (undated) DEVICE — TUBING, SUCTION, 1/4" X 10', STRAIGHT: Brand: MEDLINE

## (undated) DEVICE — APPLIER CLP LIG SM TI PREM SURGCLP SUPER INTLOK 20 DISP

## (undated) DEVICE — MASTISOL ADHESIVE LIQ 2/3ML

## (undated) DEVICE — BANDAGE COMPR SELF ADH 5 YDX6 IN TAN STRL PREMIERPRO LF

## (undated) DEVICE — 3M™ IOBAN™ 2 ANTIMICROBIAL INCISE DRAPE 6650EZ: Brand: IOBAN™ 2

## (undated) DEVICE — BAND RUB 1/8X2.5IN STRL --

## (undated) DEVICE — CARDINAL HEALTH FLEXIBLE LIGHT HANDLE COVER: Brand: CARDINAL HEALTH

## (undated) DEVICE — PAD,ABDOMINAL,5"X9",ST,LF,25/BX: Brand: MEDLINE INDUSTRIES, INC.

## (undated) DEVICE — T-MAX DISPOSABLE FACE MASK 8 PER BOX

## (undated) DEVICE — NEEDLE SPNL 22GA TRNSLUC YEL WIND HUB ANES FIT STYL DISP

## (undated) DEVICE — SUTURE VCRL + 3-0 L27IN ABSRB UD PS-2 L19MM 3/8 CIR PRIM VCP427H

## (undated) DEVICE — PACK,SHOULDER,DRAPE,POUCH: Brand: MEDLINE

## (undated) DEVICE — DRAPE,TOP,102X53,STERILE: Brand: MEDLINE

## (undated) DEVICE — 3M™ STERI-DRAPE™ U-DRAPE 1015: Brand: STERI-DRAPE™

## (undated) DEVICE — 4.0MM PRECISION ROUND

## (undated) DEVICE — DRAPE XR C ARM 41X74IN LF --

## (undated) DEVICE — 90-S ACCELERATOR, SUCTION PROBE, NON-BENDABLE, MAX CUT LEVEL 11: Brand: SERFAS ENERGY

## (undated) DEVICE — STRIP SKIN CLSR W1XL5IN NYL REINF CURAD

## (undated) DEVICE — SHOULDER ARTHRO DR BAUMGARTEN: Brand: MEDLINE INDUSTRIES, INC.

## (undated) DEVICE — SYR 10ML LUER LOK 1/5ML GRAD --

## (undated) DEVICE — STOCKINETTE,IMPERVIOUS,12X48,STERILE: Brand: MEDLINE

## (undated) DEVICE — SUTURE MCRYL SZ 3-0 L27IN ABSRB UD L19MM PS-2 3/8 CIR PRIM Y427H

## (undated) DEVICE — FLOSEAL HEMOSTATIC MATRIX, 5 ML: Brand: FLOSEAL

## (undated) DEVICE — INTENDED FOR TISSUE SEPARATION, AND OTHER PROCEDURES THAT REQUIRE A SHARP SURGICAL BLADE TO PUNCTURE OR CUT.: Brand: BARD-PARKER SAFETY BLADES SIZE 15, STERILE

## (undated) DEVICE — KIT CHAIR TRIMANO FOAM W/ SUPP ARM DRP ERGONOMICALLY DESIGNED

## (undated) DEVICE — CASPAR DISTRACTION PIN 14MM: Brand: AESCULAP

## (undated) DEVICE — DRAPE SHT 3 QTR PROXIMA 53X77 --

## (undated) DEVICE — (D)PREP SKN CHLRAPRP APPL 26ML -- CONVERT TO ITEM 371833

## (undated) DEVICE — [AGGRESSIVE 6-FLUTE BARREL BUR, ARTHROSCOPIC SHAVER BLADE,  DO NOT RESTERILIZE,  DO NOT USE IF PACKAGE IS DAMAGED,  KEEP DRY,  KEEP AWAY FROM SUNLIGHT]: Brand: FORMULA